# Patient Record
Sex: FEMALE | Race: BLACK OR AFRICAN AMERICAN | NOT HISPANIC OR LATINO | URBAN - METROPOLITAN AREA
[De-identification: names, ages, dates, MRNs, and addresses within clinical notes are randomized per-mention and may not be internally consistent; named-entity substitution may affect disease eponyms.]

---

## 2017-03-21 ENCOUNTER — EMERGENCY (EMERGENCY)
Facility: HOSPITAL | Age: 62
LOS: 1 days | Discharge: HOME | End: 2017-03-21
Attending: EMERGENCY MEDICINE | Admitting: EMERGENCY MEDICINE
Payer: SELF-PAY

## 2017-03-21 VITALS
OXYGEN SATURATION: 99 % | RESPIRATION RATE: 18 BRPM | HEART RATE: 101 BPM | DIASTOLIC BLOOD PRESSURE: 92 MMHG | SYSTOLIC BLOOD PRESSURE: 161 MMHG | TEMPERATURE: 98 F

## 2017-03-21 VITALS
HEART RATE: 95 BPM | SYSTOLIC BLOOD PRESSURE: 157 MMHG | RESPIRATION RATE: 18 BRPM | OXYGEN SATURATION: 99 % | DIASTOLIC BLOOD PRESSURE: 86 MMHG

## 2017-03-21 DIAGNOSIS — Z90.710 ACQUIRED ABSENCE OF BOTH CERVIX AND UTERUS: Chronic | ICD-10-CM

## 2017-03-21 LAB
ALBUMIN SERPL ELPH-MCNC: 4.5 G/DL — SIGNIFICANT CHANGE UP (ref 3.3–5)
ALP SERPL-CCNC: 58 U/L — SIGNIFICANT CHANGE UP (ref 40–120)
ALT FLD-CCNC: 21 U/L — SIGNIFICANT CHANGE UP (ref 4–33)
AST SERPL-CCNC: 17 U/L — SIGNIFICANT CHANGE UP (ref 4–32)
BASE EXCESS BLDV CALC-SCNC: 4.2 MMOL/L — SIGNIFICANT CHANGE UP
BASOPHILS # BLD AUTO: 0.01 K/UL — SIGNIFICANT CHANGE UP (ref 0–0.2)
BASOPHILS NFR BLD AUTO: 0.2 % — SIGNIFICANT CHANGE UP (ref 0–2)
BILIRUB SERPL-MCNC: 0.3 MG/DL — SIGNIFICANT CHANGE UP (ref 0.2–1.2)
BLOOD GAS VENOUS - CREATININE: 1.02 MG/DL — SIGNIFICANT CHANGE UP (ref 0.5–1.3)
BUN SERPL-MCNC: 25 MG/DL — HIGH (ref 7–23)
CALCIUM SERPL-MCNC: 10.3 MG/DL — SIGNIFICANT CHANGE UP (ref 8.4–10.5)
CHLORIDE BLDV-SCNC: 99 MMOL/L — SIGNIFICANT CHANGE UP (ref 96–108)
CHLORIDE SERPL-SCNC: 95 MMOL/L — LOW (ref 98–107)
CO2 SERPL-SCNC: 26 MMOL/L — SIGNIFICANT CHANGE UP (ref 22–31)
CREAT SERPL-MCNC: 1.14 MG/DL — SIGNIFICANT CHANGE UP (ref 0.5–1.3)
EOSINOPHIL # BLD AUTO: 0.03 K/UL — SIGNIFICANT CHANGE UP (ref 0–0.5)
EOSINOPHIL NFR BLD AUTO: 0.7 % — SIGNIFICANT CHANGE UP (ref 0–6)
GAS PNL BLDV: 136 MMOL/L — SIGNIFICANT CHANGE UP (ref 136–146)
GLUCOSE BLDV-MCNC: 337 — HIGH (ref 70–99)
GLUCOSE SERPL-MCNC: 329 MG/DL — HIGH (ref 70–99)
HCO3 BLDV-SCNC: 27 MMOL/L — SIGNIFICANT CHANGE UP (ref 20–27)
HCT VFR BLD CALC: 33.7 % — LOW (ref 34.5–45)
HCT VFR BLDV CALC: 35.5 % — SIGNIFICANT CHANGE UP (ref 34.5–45)
HGB BLD-MCNC: 11.3 G/DL — LOW (ref 11.5–15.5)
HGB BLDV-MCNC: 11.5 G/DL — SIGNIFICANT CHANGE UP (ref 11.5–15.5)
IMM GRANULOCYTES NFR BLD AUTO: 0.5 % — SIGNIFICANT CHANGE UP (ref 0–1.5)
LACTATE BLDV-MCNC: 1.6 MMOL/L — SIGNIFICANT CHANGE UP (ref 0.5–2)
LIDOCAIN IGE QN: 25 U/L — SIGNIFICANT CHANGE UP (ref 7–60)
LYMPHOCYTES # BLD AUTO: 1.64 K/UL — SIGNIFICANT CHANGE UP (ref 1–3.3)
LYMPHOCYTES # BLD AUTO: 37.2 % — SIGNIFICANT CHANGE UP (ref 13–44)
MCHC RBC-ENTMCNC: 27.6 PG — SIGNIFICANT CHANGE UP (ref 27–34)
MCHC RBC-ENTMCNC: 33.5 % — SIGNIFICANT CHANGE UP (ref 32–36)
MCV RBC AUTO: 82.4 FL — SIGNIFICANT CHANGE UP (ref 80–100)
MONOCYTES # BLD AUTO: 0.23 K/UL — SIGNIFICANT CHANGE UP (ref 0–0.9)
MONOCYTES NFR BLD AUTO: 5.2 % — SIGNIFICANT CHANGE UP (ref 2–14)
NEUTROPHILS # BLD AUTO: 2.48 K/UL — SIGNIFICANT CHANGE UP (ref 1.8–7.4)
NEUTROPHILS NFR BLD AUTO: 56.2 % — SIGNIFICANT CHANGE UP (ref 43–77)
PCO2 BLDV: 55 MMHG — HIGH (ref 41–51)
PH BLDV: 7.35 PH — SIGNIFICANT CHANGE UP (ref 7.32–7.43)
PLATELET # BLD AUTO: 172 K/UL — SIGNIFICANT CHANGE UP (ref 150–400)
PMV BLD: 12.2 FL — SIGNIFICANT CHANGE UP (ref 7–13)
PO2 BLDV: 33 MMHG — LOW (ref 35–40)
POTASSIUM BLDV-SCNC: 4.4 MMOL/L — SIGNIFICANT CHANGE UP (ref 3.4–4.5)
POTASSIUM SERPL-MCNC: 4.5 MMOL/L — SIGNIFICANT CHANGE UP (ref 3.5–5.3)
POTASSIUM SERPL-SCNC: 4.5 MMOL/L — SIGNIFICANT CHANGE UP (ref 3.5–5.3)
PROT SERPL-MCNC: 7.7 G/DL — SIGNIFICANT CHANGE UP (ref 6–8.3)
RBC # BLD: 4.09 M/UL — SIGNIFICANT CHANGE UP (ref 3.8–5.2)
RBC # FLD: 14.3 % — SIGNIFICANT CHANGE UP (ref 10.3–14.5)
SAO2 % BLDV: 57.8 % — LOW (ref 60–85)
SODIUM SERPL-SCNC: 135 MMOL/L — SIGNIFICANT CHANGE UP (ref 135–145)
TSH SERPL-MCNC: 18.69 UIU/ML — HIGH (ref 0.27–4.2)
WBC # BLD: 4.41 K/UL — SIGNIFICANT CHANGE UP (ref 3.8–10.5)
WBC # FLD AUTO: 4.41 K/UL — SIGNIFICANT CHANGE UP (ref 3.8–10.5)

## 2017-03-21 PROCEDURE — 93010 ELECTROCARDIOGRAM REPORT: CPT

## 2017-03-21 PROCEDURE — 99284 EMERGENCY DEPT VISIT MOD MDM: CPT | Mod: 25

## 2017-03-21 RX ORDER — INSULIN LISPRO 100/ML
6 VIAL (ML) SUBCUTANEOUS ONCE
Qty: 0 | Refills: 0 | Status: COMPLETED | OUTPATIENT
Start: 2017-03-21 | End: 2017-03-21

## 2017-03-21 RX ORDER — SODIUM CHLORIDE 9 MG/ML
1000 INJECTION INTRAMUSCULAR; INTRAVENOUS; SUBCUTANEOUS ONCE
Qty: 0 | Refills: 0 | Status: COMPLETED | OUTPATIENT
Start: 2017-03-21 | End: 2017-03-21

## 2017-03-21 RX ORDER — FAMOTIDINE 10 MG/ML
20 INJECTION INTRAVENOUS ONCE
Qty: 0 | Refills: 0 | Status: COMPLETED | OUTPATIENT
Start: 2017-03-21 | End: 2017-03-21

## 2017-03-21 RX ADMIN — SODIUM CHLORIDE 1000 MILLILITER(S): 9 INJECTION INTRAMUSCULAR; INTRAVENOUS; SUBCUTANEOUS at 10:12

## 2017-03-21 RX ADMIN — Medication 6 UNIT(S): at 13:20

## 2017-03-21 RX ADMIN — Medication 6 UNIT(S): at 10:39

## 2017-03-21 RX ADMIN — FAMOTIDINE 20 MILLIGRAM(S): 10 INJECTION INTRAVENOUS at 10:12

## 2017-03-21 RX ADMIN — Medication 30 MILLILITER(S): at 10:13

## 2017-03-21 NOTE — ED PROVIDER NOTE - MEDICAL DECISION MAKING DETAILS
uncontrolled diabetes, out of humalog without insurance, numbness tingling with abdominal pain, labs EKG, improvement with insulin and maalox

## 2017-03-21 NOTE — ED PROVIDER NOTE - PROGRESS NOTE DETAILS
Social work consulted to assist patient with obtaining insurance so she can seek outpatient medical care and obtain insulin.

## 2017-03-21 NOTE — ED ADULT NURSE NOTE - OBJECTIVE STATEMENT
Pt received to spot 26. Pt A&Ox3 complaining of abdominal pain that started earlier this morning. Pt states the pain is non radiating. Pt has a hx of hypothyroidism and diabetes. Pt states she has not been taking her Humalog as prescribed because she states she ran out 2 days ago. Pts , MD aware and at bedside. IV access obtained, labs drawn and sent. Will continue to monitor.

## 2017-03-21 NOTE — ED PROVIDER NOTE - ATTENDING CONTRIBUTION TO CARE
Attending note:   After face to face evaluation of this patient, I concur with above noted hx, pe, and care plan for this patient. +DM, hypothyroid F with abd pain, epigastric pain, dry MM.   Evaluation in progress

## 2017-03-21 NOTE — ED PROVIDER NOTE - OBJECTIVE STATEMENT
61 yo F w/ PMH T2DM presents with epigastric tight abdominal pain x 1 day non-radiating, generalized weakness and numbness and tingling in her upper and lower extremities. She has also been nauseous but denies vomiting. Last ate at 3pm yesterday. She describes a 10 year history of insulin-dependent diabetes, has not seen a doctor in over a year and ran out of her Humalog x 2 days. She said she has only taking Humalog for her diabetes for past year, was previously on Glucophage but stopped over a year ago. Denies fevers/chills currently, but describes one episode of night sweats last week. She says she "used to be hypothyroid" and stopped her medication an unspecified number of years ago. She does not remember the name of her PCP, and last got her Humalog prescribed by a doctor in [place] near where her daughter lives. 63 yo F w/ PMH T2DM presents with epigastric tight abdominal pain x 1 day non-radiating, generalized weakness and numbness and tingling in her upper and lower extremities. She has also been nauseous but denies vomiting. Last ate at 3pm yesterday. She describes a 10 year history of insulin-dependent diabetes, has not seen a doctor in over a year and ran out of her Humalog x 2 days. She said she has only taking Humalog for her diabetes for past year, was previously on Glucophage but stopped over a year ago. She does not remember the name of her PCP, and last got her Humalog prescribed by a doctor in [place] near where her daughter lives. Denies fevers/chills currently, but describes one episode of night sweats last week. She says she "used to be hypothyroid" and stopped her medication an unspecified number of years ago. Denies SOB, chest pain. Denies diarrhea, melena. Denies LE pain/swelling.

## 2019-06-19 ENCOUNTER — EMERGENCY (EMERGENCY)
Facility: HOSPITAL | Age: 64
LOS: 1 days | Discharge: HOME | End: 2019-06-19
Attending: STUDENT IN AN ORGANIZED HEALTH CARE EDUCATION/TRAINING PROGRAM | Admitting: STUDENT IN AN ORGANIZED HEALTH CARE EDUCATION/TRAINING PROGRAM
Payer: SELF-PAY

## 2019-06-19 VITALS
RESPIRATION RATE: 18 BRPM | DIASTOLIC BLOOD PRESSURE: 80 MMHG | HEART RATE: 78 BPM | SYSTOLIC BLOOD PRESSURE: 177 MMHG | OXYGEN SATURATION: 100 %

## 2019-06-19 VITALS
OXYGEN SATURATION: 100 % | RESPIRATION RATE: 18 BRPM | DIASTOLIC BLOOD PRESSURE: 82 MMHG | TEMPERATURE: 98 F | HEART RATE: 96 BPM | SYSTOLIC BLOOD PRESSURE: 140 MMHG

## 2019-06-19 DIAGNOSIS — Z90.710 ACQUIRED ABSENCE OF BOTH CERVIX AND UTERUS: Chronic | ICD-10-CM

## 2019-06-19 PROBLEM — E03.9 HYPOTHYROIDISM, UNSPECIFIED: Chronic | Status: ACTIVE | Noted: 2017-03-21

## 2019-06-19 LAB
APPEARANCE UR: CLEAR — SIGNIFICANT CHANGE UP
BILIRUB UR-MCNC: NEGATIVE — SIGNIFICANT CHANGE UP
BLOOD UR QL VISUAL: NEGATIVE — SIGNIFICANT CHANGE UP
COLOR SPEC: COLORLESS — SIGNIFICANT CHANGE UP
GLUCOSE UR-MCNC: NEGATIVE — SIGNIFICANT CHANGE UP
KETONES UR-MCNC: NEGATIVE — SIGNIFICANT CHANGE UP
LEUKOCYTE ESTERASE UR-ACNC: NEGATIVE — SIGNIFICANT CHANGE UP
NITRITE UR-MCNC: NEGATIVE — SIGNIFICANT CHANGE UP
PH UR: 6.5 — SIGNIFICANT CHANGE UP (ref 5–8)
PROT UR-MCNC: NEGATIVE — SIGNIFICANT CHANGE UP
SP GR SPEC: 1.01 — SIGNIFICANT CHANGE UP (ref 1–1.04)
UROBILINOGEN FLD QL: NORMAL — SIGNIFICANT CHANGE UP

## 2019-06-19 PROCEDURE — 73610 X-RAY EXAM OF ANKLE: CPT | Mod: 26,RT

## 2019-06-19 PROCEDURE — 73610 X-RAY EXAM OF ANKLE: CPT | Mod: 26,RT,77

## 2019-06-19 PROCEDURE — 73590 X-RAY EXAM OF LOWER LEG: CPT | Mod: 26,RT,76

## 2019-06-19 PROCEDURE — 99284 EMERGENCY DEPT VISIT MOD MDM: CPT

## 2019-06-19 PROCEDURE — 73630 X-RAY EXAM OF FOOT: CPT | Mod: 26,RT

## 2019-06-19 RX ORDER — ACETAMINOPHEN 500 MG
975 TABLET ORAL ONCE
Refills: 0 | Status: COMPLETED | OUTPATIENT
Start: 2019-06-19 | End: 2019-06-19

## 2019-06-19 RX ORDER — MORPHINE SULFATE 50 MG/1
4 CAPSULE, EXTENDED RELEASE ORAL ONCE
Refills: 0 | Status: DISCONTINUED | OUTPATIENT
Start: 2019-06-19 | End: 2019-06-19

## 2019-06-19 RX ORDER — IBUPROFEN 200 MG
600 TABLET ORAL ONCE
Refills: 0 | Status: COMPLETED | OUTPATIENT
Start: 2019-06-19 | End: 2019-06-19

## 2019-06-19 RX ADMIN — Medication 600 MILLIGRAM(S): at 12:18

## 2019-06-19 RX ADMIN — Medication 975 MILLIGRAM(S): at 12:18

## 2019-06-19 RX ADMIN — Medication 600 MILLIGRAM(S): at 09:03

## 2019-06-19 RX ADMIN — Medication 975 MILLIGRAM(S): at 09:03

## 2019-06-19 NOTE — ED PROVIDER NOTE - NS ED ROS FT
GENERAL: no fever, chills  HEENT: no cough, congestion, odynophagia, dysphagia  CARDIAC: no chest pain, palpitations, lightheadedness  PULM: no dyspnea, wheezing   GI: no abdominal pain, nausea, vomiting, diarrhea, constipation, melena, hematochezia  : + urinary frequency  NEURO: no headache, motor weakness, sensory changes  MSK: + right ankle/foot pain  SKIN: no rashes, hives  HEME: no active bleeding, bruising

## 2019-06-19 NOTE — ED PROVIDER NOTE - ATTENDING CONTRIBUTION TO CARE
63 yo female presents to ED for evaluation of right ankle and foot pain s/p slip and fall. Also with ruinary symptomat. Denies sensorimotor deficits.   Gen: no acute distress, well appearing, awake, alert and oriented x 3  Cardiac: regular rate and rhythm, +S1S2  Pulm: Clear to auscultation bilaterally  Abd: soft, nontender, nondistended  MDM  Female presents to ED for evalaution of right ankle and foot pain - will check imaging, labs, medication, reassess

## 2019-06-19 NOTE — ED PROVIDER NOTE - PHYSICAL EXAMINATION
GENERAL: no acute distress, non-toxic appearing  HEAD: normocephalic, atraumatic  HEENT: normal conjunctiva, oral mucosa moist, neck supple  CARDIAC: regular rate and rhythm, normal S1 and S2,  no appreciable murmurs  PULM: clear to ascultation bilaterally, no crackles, rales, rhonchi, or wheezing  GI: mildly ttp over suprapubic region  NEURO: alert and oriented x 3, normal speech, PERRLA, EOMI, no focal motor or sensory deficits, nonantalgic gait  MSK: ttp over posterior medial and lateral malleoli; base of 5th metacarpal of right foot; swelling over dorsum of right foot; + dp pulse  SKIN: no visible rashes, dry, well-perfused  PSYCH: appropriate mood and affect

## 2019-06-19 NOTE — ED ADULT TRIAGE NOTE - CHIEF COMPLAINT QUOTE
s/p slip and fall in the bathroom on Sunday. Denies hitting head or LOC. c/o pain and swelling to right ankle . The pain is worsening  now. Also c/o frequent urination since last night.  c/o burning urination and urgency. PMH of DM and is on Humalog.  this AM at home. Denies fever.

## 2019-06-19 NOTE — CONSULT NOTE ADULT - SUBJECTIVE AND OBJECTIVE BOX
64y Female community ambulatory presents c/o R ankle pain sp mechanical fall 2 days ago. Denies HS/LOC. Denies numbness/tingling. No other pain/injuries. Denies fevers/chills.     HEALTH ISSUES - PROBLEM Dx:        MEDICATIONS  (STANDING):    Allergies    No Known Allergies    Intolerances                  Vital Signs Last 24 Hrs  T(C): 36.9 (06-19-19 @ 08:11), Max: 36.9 (06-19-19 @ 08:11)  T(F): 98.4 (06-19-19 @ 08:11), Max: 98.4 (06-19-19 @ 08:11)  HR: 96 (06-19-19 @ 08:11) (96 - 96)  BP: 140/82 (06-19-19 @ 08:11) (140/82 - 140/82)  BP(mean): --  RR: 18 (06-19-19 @ 08:11) (18 - 18)  SpO2: 100% (06-19-19 @ 08:11) (100% - 100%)    Imaging: XR demonstrates R ankle rocio fracture    Physical Exam  Gen: Nad  R LE: Skin intact, +TTP medial/lateral malleolus, no bony ttp elsewhere, +ehl/fhl/ta/gs function, SILT SP/DP/Brown/Sap/T, dp pulse intact, negative log roll, able to SLR, compartments soft/compressive, extremity warm/well perfused  Secondary Survey: Benign, no bony ttp elsewhere, NV intact    Procedure: Patient requested no pain medication prior to reduction. Closed reduction performed. Placed in well padded trilam splint. Post procedure imaging obtained. Post procedure exam unchanged, NV intact, able to move all toes, SILT distally.     A/P: 64y Female with R ankle rocio fracture  Pain control  NWB R LE in splint, keep c/d/I, crutches as needed  Ice/elevation  Si/sx compartment syndrome discussed with patient, told to return to ED if exhibit any  Possible need for surgical intervention in future discussed, all questions answered  Follow up with Dr. Yusuf within 1 week, call office for appointment (patient understands need for followup)  Ortho stable

## 2019-06-19 NOTE — ED PROVIDER NOTE - NSFOLLOWUPINSTRUCTIONS_ED_ALL_ED_FT
Your diagnosis: fracture of right fibula and right tibia. We had our orthopedic team place a posterior splint on your ankle.     Discharge instructions:    - Please follow up with Dr. Yusuf within 1 week - call office for appointment.    - Keep splint completely dry up until your follow-up visit. For the first 1-3 days, elevate extremity as much as you can and apply ice to it 2-4 times per day for a maximum 15-20 minutes each time. Use crutches or a cane to help you ambulate if needed.    - Tylenol up to 650 mg every 8 hours as needed for pain and/or Motrin up to 600 mg every 6 hours as needed for pain.     - Be sure to return to the ED if you develop new or worsening symptoms. Specific signs and symptoms to be vigilant of: increasing or intractable pain that does not improve with a short trial of elevation and ice, burning or stinging pain, new numbness or tingling, skin findings of any new warmth, redness, discoloration, breakdown, or discharge (clear or purulent) from under or near the splinted area, increasing paralysis, malodorous splint, fevers or chills, nausea or vomiting, persistent lightheadedness.

## 2019-06-19 NOTE — ED PROVIDER NOTE - NSFOLLOWUPCLINICS_GEN_ALL_ED_FT
[facility] Orthopedic Surgery  Orthopedic Surgery  [address], [address]th [place] [address]  Phone: [phone]  Fax:   Follow Up Time:

## 2019-06-19 NOTE — ED PROVIDER NOTE - OBJECTIVE STATEMENT
64F with hx of DM presenting with right ankle and foot pain since Sunday after spraining it. Patient was in the shower, slipped and fell onto her backside. Has been able to bear weight and ambulate on it. As of this morning, also reports urinary frequency and suprapubic discomfort. Denies hematuria, fever, chills, nausea, vomiting, flank/back pain. 21-Jun-2017

## 2019-06-19 NOTE — ED PROVIDER NOTE - CLINICAL SUMMARY MEDICAL DECISION MAKING FREE TEXT BOX
64F with hx of DM presenting with right ankle and foot pain since Sunday after spraining it. Also complaining of urinary frequency. Plan - xray right foot and ankle, ua/uc.

## 2019-06-20 LAB
BACTERIA UR CULT: SIGNIFICANT CHANGE UP
SPECIMEN SOURCE: SIGNIFICANT CHANGE UP

## 2019-08-01 ENCOUNTER — EMERGENCY (EMERGENCY)
Facility: HOSPITAL | Age: 64
LOS: 1 days | Discharge: HOME | End: 2019-08-01
Admitting: EMERGENCY MEDICINE
Payer: SELF-PAY

## 2019-08-01 VITALS
OXYGEN SATURATION: 100 % | DIASTOLIC BLOOD PRESSURE: 70 MMHG | RESPIRATION RATE: 16 BRPM | HEART RATE: 90 BPM | TEMPERATURE: 98 F | SYSTOLIC BLOOD PRESSURE: 115 MMHG

## 2019-08-01 DIAGNOSIS — Z90.710 ACQUIRED ABSENCE OF BOTH CERVIX AND UTERUS: Chronic | ICD-10-CM

## 2019-08-01 PROCEDURE — 73610 X-RAY EXAM OF ANKLE: CPT | Mod: 26,RT

## 2019-08-01 PROCEDURE — 73590 X-RAY EXAM OF LOWER LEG: CPT | Mod: 26,RT

## 2019-08-01 PROCEDURE — 99283 EMERGENCY DEPT VISIT LOW MDM: CPT

## 2019-08-01 PROCEDURE — 99024 POSTOP FOLLOW-UP VISIT: CPT

## 2019-08-01 NOTE — CONSULT NOTE ADULT - ASSESSMENT
A/P: 58y Female 2 weeks s/p R ankle fracture in trilam splint, poorly compliant with weight bearing restrictions.    - Pain control  - maintain NWB RLE in splint, keep c/d/I, cane/crutches/walker as needed  - Ice/elevation  - Follow up with Dr. Cesar in 1 week, call [phone] for appointment

## 2019-08-01 NOTE — CONSULT NOTE ADULT - SUBJECTIVE AND OBJECTIVE BOX
Orthopedic Surgery Consult Note    64y Female presents s/p reduction and trilam splinting of R ankle rocio fracture in the ED on 7/19. She was instructed to follow up with Dr. Yusuf in 1 week and to be non weight-bearing with crutches as needed.  However, she did not call for an appointment in the office after the ED visit because her elevator was broken and she was unable to go up and down the stairs.  She has been partial weight on the RLE.  Denies any numbness/tingling in the RLE.  She returned to the ED today, brought by daughter, to try to have the splint removed because her pain has improved.    MEDICATIONS  (STANDING):    Allergies    No Known Allergies    Intolerances      Imaging:    Xray R ankle: bimalleolar ankle fracture re-demonstrated, no change in alignment since initial post-splint xrays.          Vital Signs Last 24 Hrs  T(C): 36.9 (08-01-19 @ 10:45), Max: 36.9 (08-01-19 @ 10:45)  T(F): 98.4 (08-01-19 @ 10:45), Max: 98.4 (08-01-19 @ 10:45)  HR: 90 (08-01-19 @ 10:45) (90 - 90)  BP: 115/70 (08-01-19 @ 10:45) (115/70 - 115/70)  BP(mean): --  RR: 16 (08-01-19 @ 10:45) (16 - 16)  SpO2: 100% (08-01-19 @ 10:45) (100% - 100%)    Imaging:   Xray     Physical Exam  Gen: NAD  RLE:   In trilam splint, intact.   wiggles toes, EHL/FHL motor intact  SILT distally  Foot WWP distally

## 2019-08-01 NOTE — ED STATDOCS - CLINICAL SUMMARY MEDICAL DECISION MAKING FREE TEXT BOX
65 y/o F w/ PMH DM (diabetes mellitus) and Hypothyroidism, presents to the ED for cast removal.  Pt s/p rocio ankle fracture, had splint placed by ortho in ED 6 weeks ago.  Pt never follow up and wants splint to be removed.  Pt has been bearing weight since, NVI; repeat xrays, ortho consult

## 2019-08-01 NOTE — ED STATDOCS - PROGRESS NOTE DETAILS
Seen by ortho and told to follow up with Anel in office next week.  Did not remove splint.  Patient left without discharge papers, called patient and discussed instructions, pt states will make appt.

## 2019-08-01 NOTE — ED STATDOCS - OBJECTIVE STATEMENT
63 y/o F w/ PMH DM (diabetes mellitus) and Hypothyroidism, presents to the ED for cast removal. Pt was seen here in ED in June s/p ankle injury, where she was told she had an ankle fracture, and received a cast to her R leg. Pt presents today to have the cast removed. Pt states she could not follow up w/ orthopedics because she did not have access to an elevator in the past 2 months. Denies any fever, chills, pain, numbness, tingling, or any other acute complaints.

## 2019-08-07 ENCOUNTER — APPOINTMENT (OUTPATIENT)
Dept: ORTHOPEDIC SURGERY | Facility: HOSPITAL | Age: 64
End: 2019-08-07

## 2020-11-30 NOTE — ED STATDOCS - NSTIMEPROVIDERCAREINITIATE_GEN_ER
01-Aug-2019 10:57 Xeldavz Pregnancy And Lactation Text: This medication is Pregnancy Category D and is not considered safe during pregnancy.  The risk during breast feeding is also uncertain.

## 2024-06-11 NOTE — ED ADULT NURSE NOTE - NS ED NURSE DISCH DISPOSITION
Chief Complaint   Patient presents with    Labs Only     Blood draw,  by MA.     Muriel Romero MA   Discharged